# Patient Record
Sex: MALE | Race: AMERICAN INDIAN OR ALASKA NATIVE | Employment: UNEMPLOYED | URBAN - METROPOLITAN AREA
[De-identification: names, ages, dates, MRNs, and addresses within clinical notes are randomized per-mention and may not be internally consistent; named-entity substitution may affect disease eponyms.]

---

## 2022-06-02 ENCOUNTER — APPOINTMENT (OUTPATIENT)
Dept: GENERAL RADIOLOGY | Age: 32
End: 2022-06-02
Payer: COMMERCIAL

## 2022-06-02 ENCOUNTER — HOSPITAL ENCOUNTER (OUTPATIENT)
Age: 32
Setting detail: OBSERVATION
Discharge: HOME | End: 2022-06-03
Attending: EMERGENCY MEDICINE | Admitting: FAMILY MEDICINE
Payer: COMMERCIAL

## 2022-06-02 ENCOUNTER — APPOINTMENT (OUTPATIENT)
Dept: CT IMAGING | Age: 32
End: 2022-06-02
Payer: COMMERCIAL

## 2022-06-02 DIAGNOSIS — R73.9 HYPERGLYCEMIA: ICD-10-CM

## 2022-06-02 DIAGNOSIS — I21.4 NSTEMI (NON-ST ELEVATED MYOCARDIAL INFARCTION) (HCC): Primary | ICD-10-CM

## 2022-06-02 PROBLEM — F19.10 POLYSUBSTANCE ABUSE (HCC): Status: ACTIVE | Noted: 2022-06-02

## 2022-06-02 PROBLEM — T50.901A OVERDOSE, ACCIDENTAL OR UNINTENTIONAL, INITIAL ENCOUNTER: Status: ACTIVE | Noted: 2022-06-02

## 2022-06-02 PROBLEM — J69.0 ASPIRATION PNEUMONITIS (HCC): Status: ACTIVE | Noted: 2022-06-02

## 2022-06-02 PROBLEM — Z72.0 TOBACCO ABUSE: Status: ACTIVE | Noted: 2022-06-02

## 2022-06-02 PROBLEM — E66.9 OBESITY WITH BODY MASS INDEX GREATER THAN 30: Status: ACTIVE | Noted: 2022-06-02

## 2022-06-02 PROBLEM — Z78.9 ALCOHOL USE: Status: ACTIVE | Noted: 2022-06-02

## 2022-06-02 LAB
-: NORMAL
ABSOLUTE EOS #: 0.15 K/UL (ref 0–0.44)
ABSOLUTE IMMATURE GRANULOCYTE: 0.17 K/UL (ref 0–0.3)
ABSOLUTE LYMPH #: 4.89 K/UL (ref 1.1–3.7)
ABSOLUTE MONO #: 0.7 K/UL (ref 0.1–1.2)
ALBUMIN SERPL-MCNC: 4.2 G/DL (ref 3.5–5.2)
ALBUMIN SERPL-MCNC: 4.4 G/DL (ref 3.5–5.2)
ALBUMIN/GLOBULIN RATIO: 1.8 (ref 1–2.5)
ALBUMIN/GLOBULIN RATIO: 2.1 (ref 1–2.5)
ALP BLD-CCNC: 116 U/L (ref 40–129)
ALP BLD-CCNC: 119 U/L (ref 40–129)
ALT SERPL-CCNC: 58 U/L (ref 5–41)
ALT SERPL-CCNC: 61 U/L (ref 5–41)
AMPHETAMINE SCREEN URINE: NEGATIVE
ANION GAP SERPL CALCULATED.3IONS-SCNC: 17 MMOL/L (ref 9–17)
ANION GAP SERPL CALCULATED.3IONS-SCNC: 21 MMOL/L (ref 9–17)
ANION GAP: 12 MMOL/L (ref 7–16)
AST SERPL-CCNC: 50 U/L
AST SERPL-CCNC: 73 U/L
BARBITURATE SCREEN URINE: NEGATIVE
BASOPHILS # BLD: 1 % (ref 0–2)
BASOPHILS ABSOLUTE: 0.09 K/UL (ref 0–0.2)
BENZODIAZEPINE SCREEN, URINE: NEGATIVE
BETA-HYDROXYBUTYRATE: 0.2 MMOL/L (ref 0.02–0.27)
BILIRUB SERPL-MCNC: 0.24 MG/DL (ref 0.3–1.2)
BILIRUB SERPL-MCNC: 0.4 MG/DL (ref 0.3–1.2)
BILIRUBIN DIRECT: 0.08 MG/DL
BILIRUBIN DIRECT: <0.08 MG/DL
BILIRUBIN URINE: NEGATIVE
BILIRUBIN, INDIRECT: 0.32 MG/DL (ref 0–1)
BILIRUBIN, INDIRECT: ABNORMAL MG/DL (ref 0–1)
BUN BLDV-MCNC: 10 MG/DL (ref 6–20)
BUN BLDV-MCNC: 11 MG/DL (ref 6–20)
C-REACTIVE PROTEIN: <3 MG/L (ref 0–5)
CALCIUM SERPL-MCNC: 8.5 MG/DL (ref 8.6–10.4)
CALCIUM SERPL-MCNC: 8.7 MG/DL (ref 8.6–10.4)
CANNABINOID SCREEN URINE: NEGATIVE
CHLORIDE BLD-SCNC: 100 MMOL/L (ref 98–107)
CHLORIDE BLD-SCNC: 100 MMOL/L (ref 98–107)
CHOLESTEROL/HDL RATIO: 3.7
CHOLESTEROL: 178 MG/DL
CHP ED QC CHECK: YES
CO2: 17 MMOL/L (ref 20–31)
CO2: 20 MMOL/L (ref 20–31)
COCAINE METABOLITE, URINE: NEGATIVE
COLOR: YELLOW
CREAT SERPL-MCNC: 0.94 MG/DL (ref 0.7–1.2)
CREAT SERPL-MCNC: 1.12 MG/DL (ref 0.7–1.2)
D-DIMER QUANTITATIVE: 1.57 MG/L FEU
EKG ATRIAL RATE: 109 BPM
EKG ATRIAL RATE: 83 BPM
EKG P AXIS: 24 DEGREES
EKG P AXIS: 25 DEGREES
EKG P-R INTERVAL: 154 MS
EKG P-R INTERVAL: 172 MS
EKG Q-T INTERVAL: 358 MS
EKG Q-T INTERVAL: 374 MS
EKG QRS DURATION: 108 MS
EKG QRS DURATION: 112 MS
EKG QTC CALCULATION (BAZETT): 439 MS
EKG QTC CALCULATION (BAZETT): 482 MS
EKG R AXIS: 2 DEGREES
EKG R AXIS: 28 DEGREES
EKG T AXIS: 19 DEGREES
EKG T AXIS: 32 DEGREES
EKG VENTRICULAR RATE: 109 BPM
EKG VENTRICULAR RATE: 83 BPM
EOSINOPHILS RELATIVE PERCENT: 1 % (ref 1–4)
EPITHELIAL CELLS UA: NORMAL /HPF (ref 0–5)
ESTIMATED AVERAGE GLUCOSE: 105 MG/DL
FERRITIN: 129 NG/ML (ref 30–400)
FLU A ANTIGEN: NEGATIVE
FLU B ANTIGEN: NEGATIVE
GFR AFRICAN AMERICAN: >60 ML/MIN
GFR AFRICAN AMERICAN: >60 ML/MIN
GFR NON-AFRICAN AMERICAN: >60 ML/MIN
GFR SERPL CREATININE-BSD FRML MDRD: >60 ML/MIN
GFR SERPL CREATININE-BSD FRML MDRD: ABNORMAL ML/MIN/{1.73_M2}
GFR SERPL CREATININE-BSD FRML MDRD: ABNORMAL ML/MIN/{1.73_M2}
GFR SERPL CREATININE-BSD FRML MDRD: NORMAL ML/MIN/{1.73_M2}
GLUCOSE BLD-MCNC: 133 MG/DL
GLUCOSE BLD-MCNC: 133 MG/DL (ref 74–100)
GLUCOSE BLD-MCNC: 138 MG/DL (ref 70–99)
GLUCOSE BLD-MCNC: 296 MG/DL (ref 70–99)
GLUCOSE URINE: NEGATIVE
HBA1C MFR BLD: 5.3 % (ref 4–6)
HCO3 VENOUS: 23.7 MMOL/L (ref 22–29)
HCT VFR BLD CALC: 46.6 % (ref 40.7–50.3)
HDLC SERPL-MCNC: 48 MG/DL
HEMOGLOBIN: 16 G/DL (ref 13–17)
IMMATURE GRANULOCYTES: 1 %
INR BLD: 1
KETONES, URINE: NEGATIVE
LACTATE DEHYDROGENASE: 289 U/L (ref 135–225)
LACTIC ACID, WHOLE BLOOD: 3.4 MMOL/L (ref 0.7–2.1)
LDL CHOLESTEROL: 122 MG/DL (ref 0–130)
LEUKOCYTE ESTERASE, URINE: NEGATIVE
LYMPHOCYTES # BLD: 36 % (ref 24–43)
MCH RBC QN AUTO: 31.4 PG (ref 25.2–33.5)
MCHC RBC AUTO-ENTMCNC: 34.3 G/DL (ref 28.4–34.8)
MCV RBC AUTO: 91.6 FL (ref 82.6–102.9)
METHADONE SCREEN, URINE: NEGATIVE
MONOCYTES # BLD: 5 % (ref 3–12)
NEGATIVE BASE EXCESS, VEN: 3 (ref 0–2)
NITRITE, URINE: NEGATIVE
NRBC AUTOMATED: 0 PER 100 WBC
O2 SAT, VEN: 68 % (ref 60–85)
OPIATES, URINE: NEGATIVE
OXYCODONE SCREEN URINE: NEGATIVE
PARTIAL THROMBOPLASTIN TIME: 22.9 SEC (ref 20.5–30.5)
PARTIAL THROMBOPLASTIN TIME: 41 SEC (ref 20.5–30.5)
PARTIAL THROMBOPLASTIN TIME: 48.7 SEC (ref 20.5–30.5)
PARTIAL THROMBOPLASTIN TIME: 51.4 SEC (ref 20.5–30.5)
PCO2, VEN: 46.6 MM HG (ref 41–51)
PDW BLD-RTO: 12.5 % (ref 11.8–14.4)
PH UA: 5.5 (ref 5–8)
PH VENOUS: 7.31 (ref 7.32–7.43)
PHENCYCLIDINE, URINE: NEGATIVE
PLATELET # BLD: 222 K/UL (ref 138–453)
PMV BLD AUTO: 11.3 FL (ref 8.1–13.5)
PO2, VEN: 38.7 MM HG (ref 30–50)
POC BUN: 10 MG/DL (ref 8–26)
POC CHLORIDE: 105 MMOL/L (ref 98–107)
POC CREATININE: 1.01 MG/DL (ref 0.51–1.19)
POC HEMATOCRIT: 44 % (ref 41–53)
POC HEMOGLOBIN: 14.9 G/DL (ref 13.5–17.5)
POC IONIZED CALCIUM: 1.08 MMOL/L (ref 1.15–1.33)
POC LACTIC ACID: 5.1 MMOL/L (ref 0.56–1.39)
POC POTASSIUM: 4.4 MMOL/L (ref 3.5–4.5)
POC SODIUM: 140 MMOL/L (ref 138–146)
POC TCO2: 24 MMOL/L (ref 22–30)
POTASSIUM SERPL-SCNC: 3.1 MMOL/L (ref 3.7–5.3)
POTASSIUM SERPL-SCNC: 4.4 MMOL/L (ref 3.7–5.3)
PRO-BNP: 186 PG/ML
PROCALCITONIN: 0.38 NG/ML
PROCALCITONIN: 0.44 NG/ML
PROTEIN UA: NEGATIVE
PROTHROMBIN TIME: 11 SEC (ref 9.1–12.3)
RBC # BLD: 5.09 M/UL (ref 4.21–5.77)
RBC UA: NORMAL /HPF (ref 0–4)
SARS-COV-2, RAPID: NOT DETECTED
SEG NEUTROPHILS: 56 % (ref 36–65)
SEGMENTED NEUTROPHILS ABSOLUTE COUNT: 7.55 K/UL (ref 1.5–8.1)
SODIUM BLD-SCNC: 137 MMOL/L (ref 135–144)
SODIUM BLD-SCNC: 138 MMOL/L (ref 135–144)
SPECIFIC GRAVITY UA: 1.04 (ref 1–1.03)
SPECIMEN DESCRIPTION: NORMAL
TEST INFORMATION: NORMAL
TOTAL PROTEIN: 6.5 G/DL (ref 6.4–8.3)
TOTAL PROTEIN: 6.5 G/DL (ref 6.4–8.3)
TRIGL SERPL-MCNC: 41 MG/DL
TROPONIN, HIGH SENSITIVITY: 13 NG/L (ref 0–22)
TROPONIN, HIGH SENSITIVITY: 37 NG/L (ref 0–22)
TROPONIN, HIGH SENSITIVITY: 42 NG/L (ref 0–22)
TROPONIN, HIGH SENSITIVITY: 59 NG/L (ref 0–22)
TURBIDITY: CLEAR
URINE HGB: ABNORMAL
UROBILINOGEN, URINE: NORMAL
WBC # BLD: 13.6 K/UL (ref 3.5–11.3)
WBC UA: NORMAL /HPF (ref 0–5)

## 2022-06-02 PROCEDURE — 82010 KETONE BODYS QUAN: CPT

## 2022-06-02 PROCEDURE — 96367 TX/PROPH/DG ADDL SEQ IV INF: CPT

## 2022-06-02 PROCEDURE — 96365 THER/PROPH/DIAG IV INF INIT: CPT

## 2022-06-02 PROCEDURE — 81001 URINALYSIS AUTO W/SCOPE: CPT

## 2022-06-02 PROCEDURE — 85730 THROMBOPLASTIN TIME PARTIAL: CPT

## 2022-06-02 PROCEDURE — 2580000003 HC RX 258: Performed by: INTERNAL MEDICINE

## 2022-06-02 PROCEDURE — 2580000003 HC RX 258: Performed by: STUDENT IN AN ORGANIZED HEALTH CARE EDUCATION/TRAINING PROGRAM

## 2022-06-02 PROCEDURE — 87804 INFLUENZA ASSAY W/OPTIC: CPT

## 2022-06-02 PROCEDURE — 96375 TX/PRO/DX INJ NEW DRUG ADDON: CPT

## 2022-06-02 PROCEDURE — 80307 DRUG TEST PRSMV CHEM ANLYZR: CPT

## 2022-06-02 PROCEDURE — 82728 ASSAY OF FERRITIN: CPT

## 2022-06-02 PROCEDURE — 80076 HEPATIC FUNCTION PANEL: CPT

## 2022-06-02 PROCEDURE — 6360000002 HC RX W HCPCS: Performed by: STUDENT IN AN ORGANIZED HEALTH CARE EDUCATION/TRAINING PROGRAM

## 2022-06-02 PROCEDURE — 84484 ASSAY OF TROPONIN QUANT: CPT

## 2022-06-02 PROCEDURE — 71045 X-RAY EXAM CHEST 1 VIEW: CPT

## 2022-06-02 PROCEDURE — 84145 PROCALCITONIN (PCT): CPT

## 2022-06-02 PROCEDURE — 86140 C-REACTIVE PROTEIN: CPT

## 2022-06-02 PROCEDURE — 36415 COLL VENOUS BLD VENIPUNCTURE: CPT

## 2022-06-02 PROCEDURE — 85025 COMPLETE CBC W/AUTO DIFF WBC: CPT

## 2022-06-02 PROCEDURE — 2060000000 HC ICU INTERMEDIATE R&B

## 2022-06-02 PROCEDURE — 83036 HEMOGLOBIN GLYCOSYLATED A1C: CPT

## 2022-06-02 PROCEDURE — 83615 LACTATE (LD) (LDH) ENZYME: CPT

## 2022-06-02 PROCEDURE — 84520 ASSAY OF UREA NITROGEN: CPT

## 2022-06-02 PROCEDURE — 82565 ASSAY OF CREATININE: CPT

## 2022-06-02 PROCEDURE — 96366 THER/PROPH/DIAG IV INF ADDON: CPT

## 2022-06-02 PROCEDURE — 82803 BLOOD GASES ANY COMBINATION: CPT

## 2022-06-02 PROCEDURE — 85610 PROTHROMBIN TIME: CPT

## 2022-06-02 PROCEDURE — 83880 ASSAY OF NATRIURETIC PEPTIDE: CPT

## 2022-06-02 PROCEDURE — 82947 ASSAY GLUCOSE BLOOD QUANT: CPT

## 2022-06-02 PROCEDURE — G0378 HOSPITAL OBSERVATION PER HR: HCPCS

## 2022-06-02 PROCEDURE — 85379 FIBRIN DEGRADATION QUANT: CPT

## 2022-06-02 PROCEDURE — 71260 CT THORAX DX C+: CPT

## 2022-06-02 PROCEDURE — 96376 TX/PRO/DX INJ SAME DRUG ADON: CPT

## 2022-06-02 PROCEDURE — 80051 ELECTROLYTE PANEL: CPT

## 2022-06-02 PROCEDURE — 85014 HEMATOCRIT: CPT

## 2022-06-02 PROCEDURE — 96361 HYDRATE IV INFUSION ADD-ON: CPT

## 2022-06-02 PROCEDURE — 82330 ASSAY OF CALCIUM: CPT

## 2022-06-02 PROCEDURE — 87635 SARS-COV-2 COVID-19 AMP PRB: CPT

## 2022-06-02 PROCEDURE — 80061 LIPID PANEL: CPT

## 2022-06-02 PROCEDURE — 87040 BLOOD CULTURE FOR BACTERIA: CPT

## 2022-06-02 PROCEDURE — 96368 THER/DIAG CONCURRENT INF: CPT

## 2022-06-02 PROCEDURE — 93005 ELECTROCARDIOGRAM TRACING: CPT | Performed by: STUDENT IN AN ORGANIZED HEALTH CARE EDUCATION/TRAINING PROGRAM

## 2022-06-02 PROCEDURE — 6360000002 HC RX W HCPCS: Performed by: INTERNAL MEDICINE

## 2022-06-02 PROCEDURE — 83605 ASSAY OF LACTIC ACID: CPT

## 2022-06-02 PROCEDURE — 99223 1ST HOSP IP/OBS HIGH 75: CPT | Performed by: INTERNAL MEDICINE

## 2022-06-02 PROCEDURE — 97162 PT EVAL MOD COMPLEX 30 MIN: CPT

## 2022-06-02 PROCEDURE — 97530 THERAPEUTIC ACTIVITIES: CPT

## 2022-06-02 PROCEDURE — 93010 ELECTROCARDIOGRAM REPORT: CPT | Performed by: INTERNAL MEDICINE

## 2022-06-02 PROCEDURE — 6360000004 HC RX CONTRAST MEDICATION: Performed by: STUDENT IN AN ORGANIZED HEALTH CARE EDUCATION/TRAINING PROGRAM

## 2022-06-02 PROCEDURE — 80048 BASIC METABOLIC PNL TOTAL CA: CPT

## 2022-06-02 PROCEDURE — 99285 EMERGENCY DEPT VISIT HI MDM: CPT

## 2022-06-02 PROCEDURE — 6370000000 HC RX 637 (ALT 250 FOR IP): Performed by: STUDENT IN AN ORGANIZED HEALTH CARE EDUCATION/TRAINING PROGRAM

## 2022-06-02 RX ORDER — ACETAMINOPHEN 325 MG/1
650 TABLET ORAL EVERY 6 HOURS PRN
Status: DISCONTINUED | OUTPATIENT
Start: 2022-06-02 | End: 2022-06-03 | Stop reason: HOSPADM

## 2022-06-02 RX ORDER — HEPARIN SODIUM 1000 [USP'U]/ML
4000 INJECTION, SOLUTION INTRAVENOUS; SUBCUTANEOUS ONCE
Status: COMPLETED | OUTPATIENT
Start: 2022-06-02 | End: 2022-06-02

## 2022-06-02 RX ORDER — ACETAMINOPHEN 650 MG/1
650 SUPPOSITORY RECTAL EVERY 6 HOURS PRN
Status: DISCONTINUED | OUTPATIENT
Start: 2022-06-02 | End: 2022-06-03 | Stop reason: HOSPADM

## 2022-06-02 RX ORDER — SODIUM CHLORIDE 9 MG/ML
INJECTION, SOLUTION INTRAVENOUS
Status: DISPENSED
Start: 2022-06-02 | End: 2022-06-02

## 2022-06-02 RX ORDER — ONDANSETRON 2 MG/ML
4 INJECTION INTRAMUSCULAR; INTRAVENOUS EVERY 6 HOURS PRN
Status: DISCONTINUED | OUTPATIENT
Start: 2022-06-02 | End: 2022-06-03 | Stop reason: HOSPADM

## 2022-06-02 RX ORDER — POTASSIUM CHLORIDE 7.45 MG/ML
10 INJECTION INTRAVENOUS ONCE
Status: DISCONTINUED | OUTPATIENT
Start: 2022-06-02 | End: 2022-06-03 | Stop reason: HOSPADM

## 2022-06-02 RX ORDER — 0.9 % SODIUM CHLORIDE 0.9 %
1000 INTRAVENOUS SOLUTION INTRAVENOUS ONCE
Status: COMPLETED | OUTPATIENT
Start: 2022-06-02 | End: 2022-06-02

## 2022-06-02 RX ORDER — ASPIRIN 81 MG/1
324 TABLET, CHEWABLE ORAL ONCE
Status: COMPLETED | OUTPATIENT
Start: 2022-06-02 | End: 2022-06-02

## 2022-06-02 RX ORDER — SODIUM CHLORIDE 0.9 % (FLUSH) 0.9 %
10 SYRINGE (ML) INJECTION PRN
Status: DISCONTINUED | OUTPATIENT
Start: 2022-06-02 | End: 2022-06-03 | Stop reason: HOSPADM

## 2022-06-02 RX ORDER — POTASSIUM CHLORIDE 7.45 MG/ML
10 INJECTION INTRAVENOUS PRN
Status: DISCONTINUED | OUTPATIENT
Start: 2022-06-02 | End: 2022-06-03 | Stop reason: HOSPADM

## 2022-06-02 RX ORDER — POTASSIUM CHLORIDE 20 MEQ/1
40 TABLET, EXTENDED RELEASE ORAL PRN
Status: DISCONTINUED | OUTPATIENT
Start: 2022-06-02 | End: 2022-06-03 | Stop reason: HOSPADM

## 2022-06-02 RX ORDER — HEPARIN SODIUM 1000 [USP'U]/ML
2000 INJECTION, SOLUTION INTRAVENOUS; SUBCUTANEOUS PRN
Status: DISCONTINUED | OUTPATIENT
Start: 2022-06-02 | End: 2022-06-03

## 2022-06-02 RX ORDER — ONDANSETRON 2 MG/ML
4 INJECTION INTRAMUSCULAR; INTRAVENOUS ONCE
Status: COMPLETED | OUTPATIENT
Start: 2022-06-02 | End: 2022-06-02

## 2022-06-02 RX ORDER — CEFTRIAXONE 1 G/1
INJECTION, POWDER, FOR SOLUTION INTRAMUSCULAR; INTRAVENOUS
Status: COMPLETED
Start: 2022-06-02 | End: 2022-06-02

## 2022-06-02 RX ORDER — ONDANSETRON 4 MG/1
4 TABLET, ORALLY DISINTEGRATING ORAL EVERY 8 HOURS PRN
Status: DISCONTINUED | OUTPATIENT
Start: 2022-06-02 | End: 2022-06-03 | Stop reason: HOSPADM

## 2022-06-02 RX ORDER — HEPARIN SODIUM AND DEXTROSE 10000; 5 [USP'U]/100ML; G/100ML
5-30 INJECTION INTRAVENOUS CONTINUOUS
Status: DISCONTINUED | OUTPATIENT
Start: 2022-06-02 | End: 2022-06-03

## 2022-06-02 RX ORDER — SODIUM CHLORIDE 0.9 % (FLUSH) 0.9 %
5-40 SYRINGE (ML) INJECTION EVERY 12 HOURS SCHEDULED
Status: DISCONTINUED | OUTPATIENT
Start: 2022-06-02 | End: 2022-06-03 | Stop reason: HOSPADM

## 2022-06-02 RX ORDER — SODIUM CHLORIDE, SODIUM LACTATE, POTASSIUM CHLORIDE, AND CALCIUM CHLORIDE .6; .31; .03; .02 G/100ML; G/100ML; G/100ML; G/100ML
1180 INJECTION, SOLUTION INTRAVENOUS ONCE
Status: COMPLETED | OUTPATIENT
Start: 2022-06-02 | End: 2022-06-02

## 2022-06-02 RX ORDER — POTASSIUM CHLORIDE 7.45 MG/ML
10 INJECTION INTRAVENOUS ONCE
Status: COMPLETED | OUTPATIENT
Start: 2022-06-02 | End: 2022-06-02

## 2022-06-02 RX ORDER — HEPARIN SODIUM 1000 [USP'U]/ML
4000 INJECTION, SOLUTION INTRAVENOUS; SUBCUTANEOUS PRN
Status: DISCONTINUED | OUTPATIENT
Start: 2022-06-02 | End: 2022-06-03

## 2022-06-02 RX ORDER — MAGNESIUM SULFATE 1 G/100ML
1000 INJECTION INTRAVENOUS PRN
Status: DISCONTINUED | OUTPATIENT
Start: 2022-06-02 | End: 2022-06-03 | Stop reason: HOSPADM

## 2022-06-02 RX ORDER — SODIUM CHLORIDE 9 MG/ML
INJECTION, SOLUTION INTRAVENOUS PRN
Status: DISCONTINUED | OUTPATIENT
Start: 2022-06-02 | End: 2022-06-03 | Stop reason: HOSPADM

## 2022-06-02 RX ADMIN — CEFTRIAXONE SODIUM 1000 MG: 500 INJECTION, POWDER, FOR SOLUTION INTRAMUSCULAR; INTRAVENOUS at 05:47

## 2022-06-02 RX ADMIN — Medication 500 MG: at 06:29

## 2022-06-02 RX ADMIN — SODIUM CHLORIDE 1000 ML: 9 INJECTION, SOLUTION INTRAVENOUS at 00:39

## 2022-06-02 RX ADMIN — SODIUM CHLORIDE, POTASSIUM CHLORIDE, SODIUM LACTATE AND CALCIUM CHLORIDE 1180 ML: 600; 310; 30; 20 INJECTION, SOLUTION INTRAVENOUS at 05:47

## 2022-06-02 RX ADMIN — HEPARIN SODIUM 4000 UNITS: 1000 INJECTION INTRAVENOUS; SUBCUTANEOUS at 04:55

## 2022-06-02 RX ADMIN — SODIUM CHLORIDE 1000 ML: 9 INJECTION, SOLUTION INTRAVENOUS at 01:40

## 2022-06-02 RX ADMIN — IOPAMIDOL 75 ML: 755 INJECTION, SOLUTION INTRAVENOUS at 03:37

## 2022-06-02 RX ADMIN — Medication 14 UNITS/KG/HR: at 21:49

## 2022-06-02 RX ADMIN — Medication 12 UNITS/KG/HR: at 04:58

## 2022-06-02 RX ADMIN — SODIUM CHLORIDE, PRESERVATIVE FREE 10 ML: 5 INJECTION INTRAVENOUS at 09:00

## 2022-06-02 RX ADMIN — ONDANSETRON 4 MG: 2 INJECTION INTRAMUSCULAR; INTRAVENOUS at 00:42

## 2022-06-02 RX ADMIN — HEPARIN SODIUM 2000 UNITS: 1000 INJECTION INTRAVENOUS; SUBCUTANEOUS at 23:02

## 2022-06-02 RX ADMIN — ASPIRIN 324 MG: 81 TABLET, CHEWABLE ORAL at 04:54

## 2022-06-02 RX ADMIN — HEPARIN SODIUM 2000 UNITS: 1000 INJECTION INTRAVENOUS; SUBCUTANEOUS at 11:40

## 2022-06-02 RX ADMIN — POTASSIUM CHLORIDE 10 MEQ: 7.46 INJECTION, SOLUTION INTRAVENOUS at 02:27

## 2022-06-02 ASSESSMENT — PAIN SCALES - GENERAL
PAINLEVEL_OUTOF10: 10
PAINLEVEL_OUTOF10: 0

## 2022-06-02 ASSESSMENT — PAIN - FUNCTIONAL ASSESSMENT: PAIN_FUNCTIONAL_ASSESSMENT: 0-10

## 2022-06-02 NOTE — ED NOTES
Pt unable to provide urine using urinal. RN ambulates pt to bathroom with urine specimen cup.       Nel Bales RN  06/02/22 8874

## 2022-06-02 NOTE — CONSULTS
[facility]   Consult Note         Today's Date: 6/2/2022  Patient Name: Skip Hart  Date of admission: 6/2/2022 12:10 AM  Patient's age: 33 y. o., 1990  Admission Dx: Hyperglycemia [R73.9]  NSTEMI (non-ST elevated myocardial infarction) ([facility] [address] [I21.4]  Overdose, accidental or unintentional, initial encounter [T55.048E]    Reason for Consult:  Cardiac evaluation    Requesting Physician: Zena Gordon DO    REASON FOR CONSULT: Troponin elevation    History Obtained From:  Patient, chart, staff, records    HISTORY OF PRESENT ILLNESS:      The patient is a 33 y.o. male who is admitted to the hospital after being found down by his friends. Reported substance and needle injection of the substance. He admits to drinking alcohol for the last 4 days. He mentioned that he has been drinking 3-4 beers a day for the last 4 days. Patient was found down and lost consciousness responded to Narcan. Cardiology consulted for troponin elevation. Troponin: 13-42-59-37  EKG no acute ST or T wave changes. Patient afebrile hemodynamically stable. Alert oriented x3  Denies chest pain/palpitations/lightheadedness. No significant past cardiac history. No family history of premature coronary artery disease. Smokes 1 cigarette in a week. Past Medical History:   has a past medical history of Alcohol use, History of cocaine use, Obesity with body mass index greater than 30, and Tobacco abuse. Past Surgical History:   has no past surgical history on file. Home Medications:    Prior to Admission medications    Not on File       potassium chloride, 10 mEq, IntraVENous, Once    sodium chloride flush, 5-40 mL, IntraVENous, 2 times per day      Allergies:  Patient has no known allergies. Social History:   reports that he has been smoking cigarettes. He has never used smokeless tobacco. He reports current alcohol use. He reports current drug use. Drugs: Marijuana (Weed) and Cocaine. Family History: family history includes Diabetes type 2  in his father. No h/o sudden cardiac death. REVIEW OF SYSTEMS:    · Constitutional: there has been no unanticipated weight loss. There's been No change in energy level, No change in activity level. · Eyes: No visual changes or diplopia. No scleral icterus. · ENT: No Headaches, hearing loss or vertigo. No mouth sores or sore throat. · Cardiovascular: No cardiac history  · Respiratory: No previous pulmonary problems  · Gastrointestinal: No abdominal pain, appetite loss, blood in stools. No change in bowel or bladder habits. · Genitourinary: No dysuria, trouble voiding, or hematuria. · Musculoskeletal:  No gait disturbance, No weakness or joint complaints. · Integumentary: No rash or pruritis. · Neurological: No headache, diplopia, change in muscle strength, numbness or tingling. No change in gait, balance, coordination, mood, affect, memory, mentation, behavior. · Psychiatric: No anxiety, or depression. · Endocrine: No temperature intolerance. No excessive thirst, fluid intake, or urination. No tremor. · Hematologic/Lymphatic: No abnormal bruising or bleeding, blood clots or swollen lymph nodes. · Allergic/Immunologic: No nasal congestion or hives. PHYSICAL EXAM:      /79   Pulse 89   Temp 98.2 °F (36.8 °C) (Oral)   Resp 18   Ht 5' 11\" (1.803 m)   Wt 235 lb (106.6 kg)   SpO2 97%   BMI 32.78 kg/m²    Constitutional and General Appearance: alert, cooperative, no distress and appears stated age  HEENT: PERRL, no cervical lymphadenopathy. No masses palpable. Normal oral mucosa  Respiratory:  · Normal excursion and expansion without use of accessory muscles  · Resp Auscultation: Good respiratory effort. No for increased work of breathing.  On auscultation: clear to auscultation bilaterally  Cardiovascular:  · The apical impulse is not displaced  · Heart tones are crisp and normal. regular S1 and S2.  · Jugular venous pulsation Normal  · The carotid upstroke is normal in amplitude and contour without delay or bruit  · Peripheral pulses are symmetrical and full   Abdomen:   · No masses or tenderness  · Bowel sounds present  Extremities:  ·  No Cyanosis or Clubbing  ·  Lower extremity edema: No  ·  Skin: Warm and dry  Neurological:  · Alert and oriented. · Moves all extremities well  · No abnormalities of mood, affect, memory, mentation, or behavior are noted          Labs:     CBC:   Recent Labs     06/02/22  0050   WBC 13.6*   HGB 16.0   HCT 46.6        BMP:   Recent Labs     06/02/22  0050 06/02/22  0050 06/02/22  0332 06/02/22  0336 06/02/22  0348     --   --  137  --    K 3.1*  --   --  4.4  --    CO2 17*  --   --  20  --    BUN 11  --   --  10  --    CREATININE 1.12  --  1.01 0.94  --    LABGLOM >60   < > >60 >60  --    GLUCOSE 296*   < >  --  138* 133    < > = values in this interval not displayed. BNP: No results for input(s): BNP in the last 72 hours. PT/INR:   Recent Labs     06/02/22  0904   PROTIME 11.0   INR 1.0     APTT:  Recent Labs     06/02/22  0434 06/02/22  1039   APTT 22.9 41.0*     CARDIAC ENZYMES:No results for input(s): CKTOTAL, CKMB, CKMBINDEX, TROPONINI in the last 72 hours. FASTING LIPID PANEL:  Lab Results   Component Value Date    HDL 48 06/02/2022    TRIG 41 06/02/2022     LIVER PROFILE:  Recent Labs     06/02/22  0228 06/02/22  0904   AST 50* 73*   ALT 58* 61*   LABALBU 4.4 4.2         Other Current Problems  Patient Active Problem List   Diagnosis    Overdose, accidental or unintentional, initial encounter    Alcohol use    Tobacco abuse    Obesity with body mass index greater than 30    NSTEMI (non-ST elevated myocardial infarction) ([facility] [address]    Aspiration pneumonitis (HCC)    Polysubstance abuse ([facility] [address]     EKG: No acute ST or T wave changes. Normal sinus rhythm  Troponin 13-42-59-37      IMPRESSION & Recommendations:    NSTEMI, type II.   Likely secondary to substance use  Pneumonia CAP versus aspiration  Continue heparin drip. Follow-up echo  We will follow    Discussed with patient, family, and Nurse. Candice Patel MD  PGY-3, Internal Medicine Resident  [address]  6/2/2022 1:34 PM    Attending Cardiologist Addendum: I have reviewed and performed the history, physical, subjective, objective, assessment, and plan with the student/resident/fellow/APN and agree with the note. I performed the history and physical personally. I have made changes to the note above as needed. Elevated trop- likely type 2- due substance abuse  ETOH  Drug abuse  - on heparin drip  - check 2d echo- if low risk, stop heparin drip and consider stress test as outpatient    Thank you for allowing me to participate in the care of this patient, please do not hesitate to call if you have any questions. Lb Mejia DO, [facility] [place], [address], [address], [place] [address] [facility]  [facility] [place]  [phone]

## 2022-06-02 NOTE — CARE COORDINATION
Case Management Initial Discharge Plan  Ricky Stephen,             Met with:patient to discuss discharge plans. Information verified: address, contacts, phone number, , insurance Yes  Insurance Provider: none, was seen by HELP  [place]: No    Emergency Contact/Next of Kin name & number: parent Rae Lombardo [phone]  Who are involved in patient's support system? family    PCP: No primary care provider on file. Date of last visit: S. Bertram, unknow      Discharge Planning    Living Arrangements:        Home has 2 stories   stairs to climb to get into front door, 1 flight stairs to climb to reach second floor  Location of bedroom/bathroom in home second level    Patient able to perform ADL's:Independent    Current Services (outpatient & in home) none  DME equipment: none  DME provider: n/a    Is patient receiving oral anticoagulation therapy? No        Does patient have any issues/concerns obtaining medications? No  If yes, what are patient's concerns? Is there a preferred Pharmacy after hours or on weekends? No    If yes, which pharmacy? Potential Assistance Needed:       Patient agreeable to home care: No  [place] of choice provided:  n/a    Prior SNF/Rehab Placement and Facility: no  Agreeable to SNF/Rehab: No  [place] of choice provided: n/a     Evaluation: n/a    Expected Discharge date:       Patient expects to be discharged to: If home: is the family and/or caregiver wiling & able to provide support at home? yes  Who will be providing this support? family    Follow Up Appointment: Best Day/ Time:      Transportation provider: family  Transportation arrangements needed for discharge: No    Readmission Risk              Risk of Unplanned Readmission:  13             Does patient have a readmission risk score greater than 14?: No  If yes, follow-up appointment must be made within 7 days of discharge.      Goals of Care: safety      Educated patient on transitional options, provided freedom of choice and are agreeable with plan      Discharge Plan: home, independent, has a ride          Electronically signed by Kalie Soto RN on 6/2/22 at 4:42 PM EDT

## 2022-06-02 NOTE — H&P
[facility]  Office: [address], DO, Gisella Delgadillo, DO, Darshan Shown, DO, Tamara Remedies Blood, DO, Bo Lopez MD, Arnie Smith MD, Britany Oropeza MD, Delores Campos MD, Apurva Peterson MD, Joana Salgado MD, Dasha Stroud MD, Rolando Davis, DO, Alyx Almodovar MD,  Franklyn Corrigan, DO, Awais Haley MD, Melvern Essex, MD, Marko Hanna MD, Saeid Sewell, DO, Darrell Haque MD, Luis Fernando Xiong MD, Eula Clive, DO, Sarah Lubin MD, Amber Guerra CNP, [address], [facility], Sincree Carrillo, [facility], Danielle Echeverria, CNP, Jhoan Mcneill, CNP, Donya Shelby, CNP, Angela Perdomo PA-C, Estevan Guerrero, [facility], Cj Sanchez, CNP, Gely Madden, CNP, Nury Jha, CNP, Laure Wells, CNS, [facility], [facility], Vasile Ellis, CNP, Dontae Rubio, CNP, Zoran Bobby, [address]    HISTORY AND PHYSICAL EXAMINATION            Date:   6/2/2022  Patient name:  Saundra Shipman  Date of admission:  6/2/2022 12:10 AM  MRN:   [id number]  Account:  [de-identified]  YOB: 1990  PCP:    No primary care provider on file. Room:   10/10  Code Status:    No Order    Chief Complaint:     Chief Complaint   Patient presents with    Loss of Consciousness     45mns PTA     History Obtained From:     patient, electronic medical record    History of Present Illness:     Saundra Shipman is a 33 y.o. gentleman who presented to [facility] after being found altered/down by his friends after utilizing an unknown substance. Patient is otherwise healthy aside from his obesity. 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 He states that he typically throws these things away, but decided to save 1 particular viral to use at a later date. He used that by earlier this evening. He subsequently became altered and lost consciousness. He did respond to Narcan.   He states that he also grabbed a waste bag earlier that had multiple dirty needles in it. Does admit to having upper respiratory symptoms in the days leading up to today's event. States that that was improving earlier today. Labs in the emergency department revealed a normal troponin initially with hyperglycemia and elevated white blood cell count. D-dimer was noted positive, and patient underwent CT imaging of the chest to rule out pulmonary embolism. He does not have a pulmonary embolism, but does have scattered groundglass opacities throughout his lungs. His second troponin and third troponins have up trended. He has been started on heparin infusion along with IV antibiotics. Influenza and COVID are negative. Patient states that he is feeling back to his baseline. Past Medical History:     Past Medical History:   Diagnosis Date    Alcohol use     History of cocaine use     Obesity with body mass index greater than 30     Tobacco abuse         Past Surgical History:     History reviewed. No pertinent surgical history. Medications Prior to Admission:     Prior to Admission medications    Not on File        Allergies:     Patient has no known allergies. Social History:     Tobacco:    reports that he has been smoking cigarettes. He has never used smokeless tobacco.  Alcohol:      reports current alcohol use. Drug Use:  reports current drug use. Drugs: Marijuana (Weed) and Cocaine. Family History:     Family History   Problem Relation Age of Onset    Diabetes type 2  Father        Review of Systems:     Positive and Negative as described in HPI.     CONSTITUTIONAL:  negative for fevers, chills, sweats, fatigue, weight loss  HEENT: Reports scratchy throat and runny nose; negative for vision, hearing changes RESPIRATORY:  negative for shortness of breath, cough, congestion, wheezing  CARDIOVASCULAR:  negative for chest pain, palpitations  GASTROINTESTINAL:  negative for nausea, vomiting, diarrhea, constipation, change in bowel habits, abdominal pain   GENITOURINARY:  negative for difficulty of urination, burning with urination, frequency   INTEGUMENT:  negative for rash, skin lesions, easy bruising   HEMATOLOGIC/LYMPHATIC:  negative for swelling/edema   ALLERGIC/IMMUNOLOGIC:  negative for urticaria , itching  ENDOCRINE:  negative increase in drinking, increase in urination, hot or cold intolerance  MUSCULOSKELETAL:  negative joint pains, muscle aches, swelling of joints  NEUROLOGICAL: Reports back pain has resolved; negative for headaches, dizziness, lightheadedness, numbness, pain, tingling extremities  BEHAVIOR/PSYCH:  negative for depression, anxiety    Physical Exam:   /83   Pulse 90   Temp 98.2 °F (36.8 °C) (Oral)   Resp 18   Ht 5' 11\" (1.803 m)   Wt 235 lb (106.6 kg)   SpO2 98%   BMI 32.78 kg/m²   Temp (24hrs), Av.2 °F (36.8 °C), Min:98.2 °F (36.8 °C), Max:98.2 °F (36.8 °C)    Recent Labs     22  0332   POCGLU 133*     No intake or output data in the 24 hours ending 22 0607    General Appearance: alert, well appearing, and in no acute distress, obese gentleman sitting up in bed  Mental status: oriented to person, place, and time  Head: normocephalic, atraumatic  Eye: no icterus, redness, pupils equal and reactive, extraocular eye movements intact, conjunctiva clear  Ear: normal external ear, no discharge, hearing intact  Nose: no drainage noted  Mouth: mucous membranes moist  Neck: supple, no carotid bruits, thyroid not palpable  Lungs: Bilateral equal air entry, poor effort, clear to ausculation, no wheezing, rales or rhonchi  Cardiovascular: normal rate, regular rhythm, no murmur, gallop, rub  Abdomen: Soft, nontender, nondistended, normal bowel sounds, no hepatomegaly or splenomegaly  Neurologic: There are no new focal motor or sensory deficits, normal muscle tone and bulk, no abnormal sensation, normal speech, cranial nerves II through XII grossly intact  Skin: No gross lesions, rashes, bruising or bleeding on exposed skin area  Extremities: peripheral pulses palpable, no pedal edema or calf pain with palpation  Psych: normal affect    Investigations:      Laboratory Testing:  Recent Results (from the past 24 hour(s))   CBC with Auto Differential    Collection Time: 06/02/22 12:50 AM   Result Value Ref Range    WBC 13.6 (H) 3.5 - 11.3 k/uL    RBC 5.09 4. 21 - 5.77 m/uL    Hemoglobin 16.0 13.0 - 17.0 g/dL    Hematocrit 46.6 40.7 - 50.3 %    MCV 91.6 82.6 - 102.9 fL    MCH 31.4 25.2 - 33.5 pg    MCHC 34.3 28.4 - 34.8 g/dL    RDW 12.5 11.8 - 14.4 %    Platelets 287 449 - 035 k/uL    MPV 11.3 8.1 - 13.5 fL    NRBC Automated 0.0 0.0 per 100 WBC    Seg Neutrophils 56 36 - 65 %    Lymphocytes 36 24 - 43 %    Monocytes 5 3 - 12 %    Eosinophils % 1 1 - 4 %    Basophils 1 0 - 2 %    Immature Granulocytes 1 (H) 0 %    Segs Absolute 7.55 1.50 - 8.10 k/uL    Absolute Lymph # 4.89 (H) 1.10 - 3.70 k/uL    Absolute Mono # 0.70 0.10 - 1.20 k/uL    Absolute Eos # 0.15 0.00 - 0.44 k/uL    Basophils Absolute 0.09 0.00 - 0.20 k/uL    Absolute Immature Granulocyte 0.17 0.00 - 0.30 k/uL   Basic Metabolic Panel    Collection Time: 06/02/22 12:50 AM   Result Value Ref Range    Glucose 296 (H) 70 - 99 mg/dL    BUN 11 6 - 20 mg/dL    CREATININE 1.12 0.70 - 1.20 mg/dL    Calcium 8.7 8.6 - 10.4 mg/dL    Sodium 138 135 - 144 mmol/L    Potassium 3.1 (L) 3.7 - 5.3 mmol/L    Chloride 100 98 - 107 mmol/L    CO2 17 (L) 20 - 31 mmol/L    Anion Gap 21 (H) 9 - 17 mmol/L    GFR Non-African American >60 >60 mL/min    GFR African American >60 >60 mL/min    GFR Comment         Troponin    Collection Time: 06/02/22 12:50 AM   Result Value Ref Range    Troponin, High Sensitivity 13 0 - 22 ng/L   D-Dimer, Quantitative    Collection Time: 06/02/22 12:50 AM   Result Value Ref Range    D-Dimer, Quant 1.57 mg/L FEU   Troponin    Collection Time: 06/02/22  2:28 AM   Result Value Ref Range    Troponin, High Sensitivity 42 (H) 0 - 22 ng/L   Hepatic Function Panel    Collection Time: 06/02/22  2:28 AM   Result Value Ref Range    Albumin 4.4 3.5 - 5.2 g/dL    Alkaline Phosphatase 119 40 - 129 U/L    ALT 58 (H) 5 - 41 U/L    AST 50 (H) <40 U/L    Total Bilirubin 0.24 (L) 0.3 - 1.2 mg/dL    Bilirubin, Direct <0.08 <0.31 mg/dL    Bilirubin, Indirect Can not be calculated 0.00 - 1.00 mg/dL    Total Protein 6.5 6.4 - 8.3 g/dL    Albumin/Globulin Ratio 2.1 1.0 - 2.5   EKG 12 Lead    Collection Time: 06/02/22  3:14 AM   Result Value Ref Range    Ventricular Rate 83 BPM    Atrial Rate 83 BPM    P-R Interval 172 ms    QRS Duration 108 ms    Q-T Interval 374 ms    QTc Calculation (Bazett) 439 ms    P Axis 25 degrees    R Axis 2 degrees    T Axis 19 degrees   Venous Blood Gas, POC    Collection Time: 06/02/22  3:32 AM   Result Value Ref Range    pH, Eusebio 7.314 (L) 7.320 - 7.430    pCO2, Eusebio 46.6 41.0 - 51.0 mm Hg    pO2, Eusebio 38.7 30.0 - 50.0 mm Hg    HCO3, Venous 23.7 22.0 - 29.0 mmol/L    Negative Base Excess, Eusebio 3 (H) 0.0 - 2.0    O2 Sat, Eusebio 68 60.0 - 85.0 %   ELECTROLYTES PLUS    Collection Time: 06/02/22  3:32 AM   Result Value Ref Range    POC Sodium 140 138 - 146 mmol/L    POC Potassium 4.4 3.5 - 4.5 mmol/L    POC Chloride 105 98 - 107 mmol/L    POC TCO2 24 22 - 30 mmol/L    Anion Gap 12 7 - 16 mmol/L   Hemoglobin and hematocrit, blood    Collection Time: 06/02/22  3:32 AM   Result Value Ref Range    POC Hemoglobin 14.9 13.5 - 17.5 g/dL    POC Hematocrit 44 41 - 53 %   Creatinine W/GFR Point of Care    Collection Time: 06/02/22  3:32 AM   Result Value Ref Range    POC Creatinine 1.01 0.51 - 1.19 mg/dL    GFR Comment >60 >60 mL/min    GFR Non-African American >60 >60 mL/min    GFR Comment         CALCIUM, IONIC (POC)    Collection Time: 06/02/22  3:32 AM   Result Value Ref Range    POC Ionized Calcium 1.08 (L) 1.15 - 1.33 mmol/L   POCT urea (BUN)    Collection Time: 06/02/22  3:32 AM   Result Value Ref Range    POC BUN 10 8 - 26 mg/dL   Lactic Acid, POC    Collection Time: 06/02/22  3:32 AM   Result Value Ref Range    POC Lactic Acid 5.10 (H) 0.56 - 1.39 mmol/L   POCT Glucose    Collection Time: 06/02/22  3:32 AM   Result Value Ref Range    POC Glucose 133 (H) 74 - 100 mg/dL   Troponin    Collection Time: 06/02/22  3:36 AM   Result Value Ref Range    Troponin, High Sensitivity 59 (HH) 0 - 22 ng/L   Beta-Hydroxybutyrate    Collection Time: 06/02/22  3:36 AM   Result Value Ref Range    Beta-Hydroxybutyrate 0.20 0.02 - 0.27 mmol/L   Basic Metabolic Panel    Collection Time: 06/02/22  3:36 AM   Result Value Ref Range    Glucose 138 (H) 70 - 99 mg/dL    BUN 10 6 - 20 mg/dL    CREATININE 0.94 0.70 - 1.20 mg/dL    Calcium 8.5 (L) 8.6 - 10.4 mg/dL    Sodium 137 135 - 144 mmol/L    Potassium 4.4 3.7 - 5.3 mmol/L    Chloride 100 98 - 107 mmol/L    CO2 20 20 - 31 mmol/L    Anion Gap 17 9 - 17 mmol/L    GFR Non-African American >60 >60 mL/min    GFR African American >60 >60 mL/min    GFR Comment         POCT glucose    Collection Time: 06/02/22  3:48 AM   Result Value Ref Range    Glucose 133 mg/dL    QC OK? yes    APTT    Collection Time: 06/02/22  4:34 AM   Result Value Ref Range    PTT 22.9 20.5 - 30.5 sec   Urinalysis with Reflex to Culture    Collection Time: 06/02/22  4:40 AM    Specimen: Urine   Result Value Ref Range    Color, UA Yellow Yellow    Turbidity UA Clear Clear    Glucose, Ur NEGATIVE NEGATIVE    Bilirubin Urine NEGATIVE NEGATIVE    Ketones, Urine NEGATIVE NEGATIVE    Specific Gravity, UA 1.037 (H) 1.005 - 1.030    Urine Hgb TRACE (A) NEGATIVE    pH, UA 5.5 5.0 - 8.0    Protein, UA NEGATIVE NEGATIVE    Urobilinogen, Urine Normal Normal    Nitrite, Urine NEGATIVE NEGATIVE    Leukocyte Esterase, Urine NEGATIVE NEGATIVE   COVID-19, Rapid    Collection Time: 06/02/22  4:40 AM    Specimen: Nasopharyngeal Swab   Result Value Ref Range    Specimen Description . NASOPHARYNGEAL SWAB     SARS-CoV-2, Rapid Not Detected Not Detected   Microscopic Urinalysis Collection Time: 06/02/22  4:40 AM   Result Value Ref Range    -          WBC, UA None 0 - 5 /HPF    RBC, UA 0 TO 2 0 - 4 /HPF    Epithelial Cells UA None 0 - 5 /HPF   RAPID INFLUENZA A/B ANTIGENS    Collection Time: 06/02/22  5:08 AM    Specimen: Nasopharyngeal   Result Value Ref Range    Flu A Antigen NEGATIVE NEGATIVE    Flu B Antigen NEGATIVE NEGATIVE   Lactic Acid    Collection Time: 06/02/22  5:34 AM   Result Value Ref Range    Lactic Acid, Whole Blood 3.4 (H) 0.7 - 2.1 mmol/L       Imaging/Diagnostics:  XR CHEST PORTABLE    Result Date: 6/2/2022  Clear chest without acute cardiopulmonary process. CT CHEST PULMONARY EMBOLISM W CONTRAST    Result Date: 6/2/2022  1. No pulmonary emboli. 2. Patchy ground-glass changes seen in the lungs, greatest within the lower lobes. Findings suspicious for multifocal pneumonia such as viral pneumonia. Atelectasis is in the differential, with pulmonary edema felt unlikely. Assessment :      Hospital Problems           Last Modified POA    * (Principal) Overdose, accidental or unintentional, initial encounter 6/2/2022 Yes    NSTEMI (non-ST elevated myocardial infarction) ([address] 6/2/2022 Yes    Aspiration pneumonitis ([address] 6/2/2022 Yes    Alcohol use 6/2/2022 Yes    Tobacco abuse 6/2/2022 Yes    Obesity with body mass index greater than 30 6/2/2022 Yes          Plan:     Patient status inpatient in the Progressive Unit/Step down    Admit to [facility]    Accidental drug overdose  -Uncertain as to what substance, responded to Narcan  -Check UDS    NSTEMI  -Consult cardiology  -Check 2D echo  -Heparin infusion  -Start statin and aspirin therapy while work-up is underway  -Check lipids    Patchy groundglass opacities on CT imaging  -Likely aspiration pneumonitis due to downtime  -Continue azithromycin/Rocephin  -Check sputum culture, mycoplasma  -Patient does admit to a URI earlier this week.     Obesity with BMI of 32.78  -Diet/weight loss/exercise recommended    Hyperglycemiacheck A1c      Consultations:   IP CONSULT TO HOSPITALIST  IP CONSULT TO CARDIOLOGY    Patient is admitted as inpatient status because of co-morbidities listed above, severity of signs and symptoms as outlined, requirement for current medical therapies and most importantly because of direct risk to patient if care not provided in a hospital setting. Expected length of stay > 48 hours. 33 Alejandra Hennessy DO  6/2/2022  6:07 AM    Copy sent to Dr. James primary care provider on file.

## 2022-06-02 NOTE — ED NOTES
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 Resident made aware. Hospitalist at bedside to assess pt. Pt admits to snorting an unknown substance PTA. Pt reports he found a baggy of white powder on ground at motel and thought it was \"blow\". Pt reports after snorting substance he became unconscious. Pt was found shortly afterwards by parents whom contacted EMS.       Asha Saul RN  06/02/22 0726

## 2022-06-02 NOTE — ED NOTES
Pt to ED via [facility] squad due to Loss of consciousness 45mns PTA. Per squad family of the pt called them because pt is unconscious and they give 4mg of Narcan at the scene. Pt states he just drink 2 bottle of beer with \"friend\". Pt also states \"I don't know what did I take, I don't want to put someone in trouble\". No history of HTH, DM, Asthma and COPD upon assessment. EKG done, placed pt on full cardiac monitor. Vital signs taken and recorded. Call light button w/in reach. Will cont to monitor the pt.  A&O x4.     Erlin Tenorio RN  06/02/22 5653

## 2022-06-02 NOTE — ED NOTES
Report called to [address] [place] [address], all questions answered     Krishna Malone RN  06/02/22 4645

## 2022-06-02 NOTE — ED PROVIDER NOTES
[facility] ED  Emergency Department Encounter  Emergency Medicine Resident     Pt Name: Wing Mcgill  MRN: [id number]  [place] 1990  Date of evaluation: 6/2/22  PCP:  No primary care provider on file. CHIEF COMPLAINT       Chief Complaint   Patient presents with    Loss of Consciousness     45mns PTA       HISTORY OFPRESENT ILLNESS  (Location/Symptom, Timing/Onset, Context/Setting, Quality, Duration, Modifying Factors,Severity.)      Wing Mcgill is a 33 y.o. male with past medical history. Patient presents via EMS after Narcan was utilized in the field to facilitate and waking up patient. Patient was found by a friend at home in respiratory distress versus unknown reason for loss of consciousness. Upon arrival patient denied taking anything or using any drugs today. Patient did note that he does smoke tobacco.  Patient initially complaining of chest pain, shortness of breath, generally feeling unwell, nausea and vomiting. Patient did receive 4 mg of Narcan via EMS prior to arrival due to concerns for respiratory distress of unknown etiology. Patient denies any pain with urination, headache, neck pain, back pain. Patient does not believe that he was assaulted. Patient was found to his home. After multiple rounds of questioning patient did tell nursing staff that he used an unknown substance today although prior to this patient was generally feeling unwell with a scratchy throat for the past few days. PAST MEDICAL / SURGICAL / SOCIAL / FAMILY HISTORY      has no past medical history on file. has no past surgical history on file. Social:      Family Hx: History reviewed. No pertinent family history. Allergies:  Patient has no known allergies.     Home Medications:  Prior to Admission medications    Not on File       REVIEW OFSYSTEMS    (2-9 systems for level 4, 10 or more for level 5)      Positive: Nausea, vomiting, chest pain, shortness of breath    Negative: Headache, pain, blurred vision, ear pain, difficulty swallowing, cough, congestion, bloody emesis, blood in his stool, diarrhea, constipation, dysurea, penile discharge    PHYSICAL EXAM   (up to 7 for level 4, 8 or more forlevel 5)      INITIAL VITALS:   Vitals:    06/02/22 0351   BP: 124/70   Pulse: 90   Resp: 18   Temp:    SpO2: 96%        Physical Exam  Vitals and nursing note reviewed. Constitutional:       General: He is not in acute distress. Appearance: He is not ill-appearing, toxic-appearing or diaphoretic. Comments: Appears to feel unwell although is in no acute distress no respiratory distress, interactive with my examination. HENT:      Head: Normocephalic and atraumatic. Nose: Nose normal.      Mouth/Throat:      Mouth: Mucous membranes are moist.      Pharynx: Oropharynx is clear. Eyes:      General:         Right eye: No discharge. Left eye: No discharge. Comments: 1 mm bilaterally    Cardiovascular:      Rate and Rhythm: Normal rate and regular rhythm. Pulses: Normal pulses. Heart sounds: Normal heart sounds. Pulmonary:      Effort: Pulmonary effort is normal. No respiratory distress. Breath sounds: Normal breath sounds. No wheezing. Comments: Course Breath sounds noted throughout although no wheezing, no rhonchi no rales, supplemental oxygen of 2 L nasal cannula in place although patient is able to speak in full sentences, no respiratory distress. Abdominal:      General: Abdomen is flat. There is no distension. Palpations: Abdomen is soft. Tenderness: There is no abdominal tenderness. There is no guarding or rebound. Musculoskeletal:      Cervical back: Normal range of motion. Right lower leg: No edema. Left lower leg: No edema. Skin:     General: Skin is warm and dry. Neurological:      General: No focal deficit present. Mental Status: He is alert and oriented to person, place, and time.       Comments: Patient alert, oriented x3, interactive, answering questions appropriately, no focal neurological deficits, easily protecting his airway. Psychiatric:         Mood and Affect: Mood normal.         Behavior: Behavior normal.         DIFFERENTIAL  DIAGNOSIS       Initial MDM/Plan: 33 y.o. male who presents with reports of respiratory distress, found down, did awaken to Narcan otherwise generally feeling unwell prior to that. Upon my initial examination patient is resting the cot, appears to feel unwell, vomiting, protecting his airway, on 2 L nasal cannula initially, tachycardic in the 110's, alert, oriented, answering questions appropriately. Is noting that he does have chest pain and shortness of breath, will obtain cardiac work-up to include CBC, BMP, troponin, EKG, chest x-ray. Patient continues to plaint of shortness of breath. We will add on D-dimer. Patient's work-up initially BMP remarkable for elevated anion gap with elevated blood sugar. Repeat BMP after 1.5 L of fluid did show that anion gap has resolved, beta hydroxybutyrate within normal limits. Will have admitting service obtain hemoglobin A1c and decide on need for hypoglycemic treatment. Patient noted to have NSTEMI with elevated troponins, EKG within normal limits. Patient's elevated lactic, white blood cell count, source of pneumonia does meet sepsis criteria. Blood cultures drawn. IV fluids have been given, cc per KG bolus ordered, 2 L have already been ordered, will order another 1180 mL. Took quite full 30cc/kg bolus of 3.18 L IV antibiotics of Rocephin and azithromycin ordered. Repeat lactic ordered. Patient has not been hypotensive.     Sepsis Times and Checklist  Vital Signs: BP: 124/70  Heart Rate: 90  Resp: 18  Temp: 98.2 °F (36.8 °C) SpO2: 96 %  SIRS (>2)   Temp > 38.3C or < 36C   HR > 90   RR > 20   WBC > 12 or < 4 or >10% bands  SIRS (>2) and confirmed or suspected source of infection = Sepsis  Is Sepsis due to likely bacterial infection?: Yes   If not, then sepsis is due to likely n/a etiology. Sepsis Identified:  Date: 6/2/2022   Time: 0409  Sepsis Orders:  ·  CBC(required): Yes  ·  CMP: Yes  ·  PT/PTT: No  ·  Blood Cultures x2(required): Yes  ·  Urinalysis and Urine Culture: Yes  ·  Lactate(required): Yes  ·  Antibiotics Given (within 3 hours of sepsis identification, after blood cultures):  Yes    (If unable to obtain IV access for IV antibiotics within 3 hours of identification of sepsis, IM antibiotics is acceptable.)  ·             If lactate >2.0 MUST repeat within 6 hours    If elevated, is elevated lactate from a likely infectious source?: No it is secondary to dehydration. IV Fluid Bolus:  Is lactate > 4.0:  Yes  If lactate >  4.0 OR hypotension (MAP<65 mmHg) (with 2 BP readings) 30ml/kg crystalloid MUST be ordered. Must have documented in the medical record:   Administration of 30 mL/kg of crystalloid fluids would be detrimental or harmful for the patient;    AND that the patient has one of the following conditions, OR that a portion of the crystalloid fluid volume was administered as colloids (if a portion consisted of colloids, there must be an order and documentation that colloids were started or noted as given);  o n/a ordered. (If a portion consisted of colloids, there must be an order and documentation that colloids were started or noted as given.)   o the volume of crystalloid fluids in place of 30 mL/kg the patient was to receive is 3180, (Order for this amount of fluid must be placed)     Fluids must be initiated within 3 hours of sepsis identification. These fluids must have a rate > 125 ml/hr. · Is the patient Morbidly Obese (BMI > 30): No  · IV Fluids given prior to arrival can be used in this calculation but the following information must be documented:  Start time: , Type of fluid NS + LR, Volume of fluid 3180, and Rate (Duration or End time) 0631.     · Does the patient or patient advocate refuse the entire 30 ml/kg IV fluid bolus? No      Septic Shock Identified (Initial lactate > 4.0 or 2 Hypotensive Blood Pressure readings within the first 6 hours): For septic shock sepsis focus physical exam must be completed AND documented (within 6 hours). Date:  Time:       Sepsis focus exam completed. - not identified    For persistent hypotension after 30ml/kg fluid bolus vasopressors must be started (within 6 hours)   -n/a      EMERGENCY DEPARTMENT COURSE:  ED Course as of 06/02/22 0552   Thu Jun 02, 2022   1563 Basic Metabolic Panel(!):    GLUCOSE, FASTING,(!)   BUN,BUNPL 11   Creatinine 1.12   CALCIUM, SERUM, 453410 8.7   Sodium 138   Potassium 3.1(!)   Chloride 100   CO2 17(!)   Anion Gap 21(!)   GFR Non- >60   GFR  >60   GFR Comment       Hypokalemia noted, IV potassium ordered as patient is not tolerating orals. [MA]   0229 CBC with Auto Differential(!):    WBC 13.6(!)   RBC 5.09   Hemoglobin Quant 16.0   Hematocrit 46.6   MCV 91.6   MCH 31.4   MCHC 34.3   RDW 12.5   Platelet Count 968   MPV 11.3   NRBC Automated 0.0   Seg Neutrophils 56   Lymphocytes 36   Monocytes 5   Eosinophils % 1   Basophils 1   Immature Granulocytes 1(!)   Segs Absolute 7.55   Absolute Lymph # 4.89(!)   Absolute Mono # 0.70   Absolute Eos # 0.15   Basophils Absolute 0.09   Absolute Immature Granulocyte 0.17   elevated white blood cell count although patient is afebrile, low suspicion for infectious etiology at this time, likely secondary to stress response. [MA]   0229 Troponin:    Troponin, High Sensitivity 13  Troponin 13. Will obtain repeat. EKG nonacute [MA]   0230 After 1 L bolus, patient's [MA]   0230  heart rate has normalized to less than 100. Second liter ordered. [MA]   0230 D-Dimer, Quantitative:    D-Dimer, Quant 1.57  D-Dimer elevated. CT with PE protocol ordered. [MA]   0321 Anion Gap(!): 21  Noted elevated anion gap with increase blood glucose level.  Repeat BMP ordered. EPOC vbg, urine ordered for ketones. Beta-hydroxybutyrate [MA]   [id number] Patient receiving second liter of fluids now. [MA]   0456 Venous Blood Gas, POC(!):    pH, Eusebio 7.314(!)   pCO2, Eusebio 46.6   pO2, Eusebio 38.7   HCO3, Venous 23.7   Negative Base Excess, Eusebio 3(!)   O2 Sat, Eusebio 68 [MA]   0515 COVID-19, Rapid:    Specimen Description . NASOPHARYNGEAL SWAB   SARS-CoV-2, Rapid Not Detected  negative [MA]   0522 To be admitted to [facility] team for NSTEMI, pneumonia. IV antibiotics have been ordered. Fluid resuscitation initiated. Blood cultures ordered. Plan for admission for further work-up and monitoring. [MA]   4202 Discussed with [facility] attending, they are excepting patient at this time. Patient to be admitted to [facility] for pneumonia and NSTEMI. Patient admitted in vitally stable condition after remaining vitally stable throughout emergency room stay.  [MA]      ED Course User Index  [MA] Khai Delaney,         DIAGNOSTIC RESULTS / EMERGENCYDEPARTMENT COURSE / MDM     LABS:  Labs Reviewed   CBC WITH AUTO DIFFERENTIAL - Abnormal; Notable for the following components:       Result Value    WBC 13.6 (*)     Immature Granulocytes 1 (*)     Absolute Lymph # 4.89 (*)     All other components within normal limits   BASIC METABOLIC PANEL - Abnormal; Notable for the following components:    Glucose 296 (*)     Potassium 3.1 (*)     CO2 17 (*)     Anion Gap 21 (*)     All other components within normal limits   TROPONIN - Abnormal; Notable for the following components:    Troponin, High Sensitivity 42 (*)     All other components within normal limits   TROPONIN - Abnormal; Notable for the following components:    Troponin, High Sensitivity 59 (*)     All other components within normal limits   URINALYSIS WITH REFLEX TO CULTURE - Abnormal; Notable for the following components:    Specific Gravity, UA 1.037 (*)     Urine Hgb TRACE (*)     All other components within normal limits   BASIC METABOLIC PANEL - Abnormal; Notable for the following components:    Glucose 138 (*)     Calcium 8.5 (*)     All other components within normal limits   CALCIUM, IONIC (POC) - Abnormal; Notable for the following components:    POC Ionized Calcium 1.08 (*)     All other components within normal limits   VENOUS BLOOD GAS, POINT OF CARE - Abnormal; Notable for the following components:    pH, Eusebio 7.314 (*)     Negative Base Excess, Eusebio 3 (*)     All other components within normal limits   LACTIC ACID,POINT OF CARE - Abnormal; Notable for the following components:    POC Lactic Acid 5.10 (*)     All other components within normal limits   POCT GLUCOSE - Abnormal; Notable for the following components:    POC Glucose 133 (*)     All other components within normal limits   POCT GLUCOSE - Normal   COVID-19, RAPID   RAPID INFLUENZA A/B ANTIGENS   CULTURE, BLOOD 1   CULTURE, BLOOD 1   TROPONIN   D-DIMER, QUANTITATIVE   BETA-HYDROXYBUTYRATE   ELECTROLYTES PLUS   HGB/HCT   APTT   MICROSCOPIC URINALYSIS   APTT   APTT   LACTIC ACID   HEPATIC FUNCTION PANEL   CREATININE W/GFR POINT OF CARE   UREA N (POC)   POC BLOOD GAS         RADIOLOGY:  XR CHEST PORTABLE    Result Date: 6/2/2022  EXAMINATION: ONE XRAY VIEW OF THE CHEST 6/2/2022 1:24 am COMPARISON: None. HISTORY: ORDERING SYSTEM PROVIDED HISTORY: shortness of breath, narcan, tachycardia TECHNOLOGIST PROVIDED HISTORY: shortness of breath, narcan, tachycardia FINDINGS: Cardiac and mediastinal silhouettes appear within normal limits for size. Pulmonary vascularity is normal.  No parenchymal consolidation or pleural effusion. No pneumothorax. No acute osseous abnormality. Clear chest without acute cardiopulmonary process. CT CHEST PULMONARY EMBOLISM W CONTRAST    Result Date: 6/2/2022  EXAMINATION: CTA OF THE CHEST 6/2/2022 3:28 am TECHNIQUE: CTA of the chest was performed after the administration of intravenous contrast.  Multiplanar reformatted images are provided for review.   MIP images are provided for review. Automated exposure control, iterative reconstruction, and/or weight based adjustment of the mA/kV was utilized to reduce the radiation dose to as low as reasonably achievable. COMPARISON: None. HISTORY: ORDERING SYSTEM PROVIDED HISTORY: elevated dimer, shortness of breath, r/o PE TECHNOLOGIST PROVIDED HISTORY: elevated dimer, shortness of breath, r/o PE Decision Support Exception - unselect if not a suspected or confirmed emergency medical condition->Emergency Medical Condition (MA) FINDINGS: Pulmonary Arteries: Pulmonary arteries are adequately opacified for evaluation. No evidence of intraluminal filling defect to suggest pulmonary embolism. Main pulmonary artery is normal in caliber. Mediastinum: No thoracic aortic aneurysm. No dissection. No focal esophageal thickening. No mediastinal lymphadenopathy. Minimal fluid in the distal thoracic esophagus may be related to reflux. Lungs/pleura: No pleural effusion. Mild ground-glass changes are seen, greatest within the lower lobes. No consolidation, spiculated lung mass, or peribronchial thickening. Upper Abdomen: No acute process seen within the upper abdomen. No focal inflammatory process. Soft Tissues/Bones: No axillary or supraclavicular lymphadenopathy. No acute osseous abnormality. Minimal degenerative change in the spine with small Schmorl's nodes in the midthoracic spine. 1. No pulmonary emboli. 2. Patchy ground-glass changes seen in the lungs, greatest within the lower lobes. Findings suspicious for multifocal pneumonia such as viral pneumonia. Atelectasis is in the differential, with pulmonary edema felt unlikely. EKG  Sinus tachycardia, rate of 109, parable 154,  . No ST segment elevation, no ST segment depression, no T wave abnormalities. Nonspecific EKG. P EKG showing resolution of sinus tachycardia, ventricular rate of 83. VT interval 172,  .   No ST segment elevation, no ST segment depression, T wave flattening in leads III noted. Nonspecific EKG. All EKG's are interpreted by the Emergency Department Physicianwho either signs or Co-signs this chart in the absence of a cardiologist.        PROCEDURES:  None    CONSULTS:  IP CONSULT TO HOSPITALIST  IP CONSULT TO CARDIOLOGY      FINAL IMPRESSION      1. NSTEMI (non-ST elevated myocardial infarction) ([facility] [address]    2. Hyperglycemia          DISPOSITION / PLAN     DISPOSITION  06/02/2022 05:44:57 AM      PATIENT REFERRED TO:  No follow-up provider specified.     DISCHARGE MEDICATIONS:  New Prescriptions    No medications on file       Ivory Vasquez DO  Emergency Medicine Resident    (Please note that portions of this note were completed with a voice recognition program.Efforts were made to edit the dictations but occasionally words are mis-transcribed.)       Ivory Vasquez DO  Resident  06/02/22 3031

## 2022-06-02 NOTE — PROGRESS NOTES
Physical Therapy  Facility/Department: [facility]  Physical Therapy Initial Assessment    Name: Chan Aranda  : 1990  MRN: [id number]  Date of Service: 2022  Chief Complaint   Patient presents with    Loss of Consciousness     45mns PTA     Discharge Recommendations:  No therapy recommended at discharge      Patient Diagnosis(es): The primary encounter diagnosis was NSTEMI (non-ST elevated myocardial infarction) ([facility] [address] A diagnosis of Hyperglycemia was also pertinent to this visit. Past Medical History:  has a past medical history of Alcohol use, History of cocaine use, Obesity with body mass index greater than 30, and Tobacco abuse. Past Surgical History:  has no past surgical history on file. Assessment   Assessment: the pt ambulated 300 ft without a device x SBA. He had no c/o dizziness, pain or fatigue with ambulation. He ambulated safely with no further need for any additional PT intervention. Therapy Prognosis: Good  Decision Making: Medium Complexity  Requires PT Follow-Up: No  Activity Tolerance  Activity Tolerance: Patient tolerated treatment well     Plan   Plan  Plan: Discharge with evaluation only  Safety Devices  Type of Devices: Nurse notified,Left in bed,Call light within reach     Restrictions  Position Activity Restriction  Other position/activity restrictions:  Up with assist     Subjective   General  Patient assessed for rehabilitation services?: Yes  Response To Previous Treatment: Not applicable  Family / Caregiver Present: No  Follows Commands: Within Functional Limits  Subjective  Subjective: Pt reports back pain of 2/10      RN amd pt agreeable to PT eval     Social/Functional History  Social/Functional History  Lives With: Spouse  Type of Home: House  Home Layout: One level  Home Access: Stairs to enter without rails  Entrance Stairs - Number of Steps: 1 step  Bathroom Shower/Tub: Tub/Shower unit  Bathroom Toilet: Standard  Home Equipment:  (No DME at baseline)  Has the patient had two or more falls in the past year or any fall with injury in the past year?: No  ADL Assistance: [address]: [address] [place] [address] Responsibilities: Yes  Ambulation Assistance: Independent  Transfer Assistance: Independent  Active : Yes  Mode of Transportation: Car  Occupation: Full time employment  Type of Occupation:   Leisure & Hobbies: Basketball, working out, and time with friends  Vision/Hearing  Vision  Vision: Within Functional Limits  Hearing  Hearing: Within functional limits    Cognition   Orientation  Overall Orientation Status: Within Functional Limits  Cognition  Overall Cognitive Status: WFL     Objective   Heart Rate: 89  BP: 123/79  MAP (Calculated): 93.67  Resp: 18  SpO2: 97 %  O2 Device: on room air     AROM RLE (degrees)  RLE AROM: WNL  AROM LLE (degrees)  LLE AROM : WNL  AROM RUE (degrees)  RUE AROM : WNL  AROM LUE (degrees)  LUE AROM : WNL  Strength RLE  Strength RLE: WNL  Strength LLE  Strength LLE: WNL  Strength RUE  Strength RUE: WNL  Strength LUE  Strength LUE: WNL        Bed mobility  Supine to Sit: Stand by assistance  Sit to Supine: Stand by assistance  Scooting: Stand by assistance  Transfers  Sit to Stand: Stand by assistance  Stand to sit: Stand by assistance  Ambulation  Surface: level tile  Device: No Device  Assistance: Stand by assistance  Distance: amb 300 ft without a device x SBA     Balance  Posture: Good  Sitting - Static: Good  Sitting - Dynamic: Good  Standing - Static: Good  Standing - Dynamic: Good         OutComes Score     AM-PAC Score  AM-PAC Inpatient Mobility Raw Score : 21 (06/02/22 1448)  AM-PAC Inpatient T-Scale Score : 50.25 (06/02/22 1448)  Mobility Inpatient CMS 0-100% Score: 28.97 (06/02/22 1448)  Mobility Inpatient CMS G-Code Modifier : CJ (06/02/22 1448)        Goals  Short Term Goals  Time Frame for Short term goals: No PT needs at this time    DC   PT     Education  Patient Education  Education Given To: Patient  Education Provided: Role of Therapy;Plan of Care  Education Method: Verbal  Barriers to Learning: None  Education Outcome: Verbalized understanding    Therapy Time   Individual Concurrent Group Co-treatment   Time In 1400         Time Out 1423         Minutes [address] Rush Zhao, PT

## 2022-06-03 VITALS
SYSTOLIC BLOOD PRESSURE: 123 MMHG | TEMPERATURE: 98 F | HEIGHT: 71 IN | DIASTOLIC BLOOD PRESSURE: 89 MMHG | HEART RATE: 83 BPM | RESPIRATION RATE: 14 BRPM | BODY MASS INDEX: 32.62 KG/M2 | OXYGEN SATURATION: 94 % | WEIGHT: 233.03 LBS

## 2022-06-03 PROBLEM — T50.901A DRUG OVERDOSE, ACCIDENTAL OR UNINTENTIONAL, INITIAL ENCOUNTER: Status: ACTIVE | Noted: 2022-06-03

## 2022-06-03 LAB
ABSOLUTE EOS #: 0.15 K/UL (ref 0–0.44)
ABSOLUTE IMMATURE GRANULOCYTE: 0.08 K/UL (ref 0–0.3)
ABSOLUTE LYMPH #: 4.86 K/UL (ref 1.1–3.7)
ABSOLUTE MONO #: 0.66 K/UL (ref 0.1–1.2)
ANION GAP SERPL CALCULATED.3IONS-SCNC: 11 MMOL/L (ref 9–17)
BASOPHILS # BLD: 1 % (ref 0–2)
BASOPHILS ABSOLUTE: 0.06 K/UL (ref 0–0.2)
BUN BLDV-MCNC: 7 MG/DL (ref 6–20)
CALCIUM SERPL-MCNC: 9 MG/DL (ref 8.6–10.4)
CHLORIDE BLD-SCNC: 103 MMOL/L (ref 98–107)
CO2: 25 MMOL/L (ref 20–31)
CREAT SERPL-MCNC: 0.72 MG/DL (ref 0.7–1.2)
EOSINOPHILS RELATIVE PERCENT: 1 % (ref 1–4)
GFR AFRICAN AMERICAN: >60 ML/MIN
GFR NON-AFRICAN AMERICAN: >60 ML/MIN
GFR SERPL CREATININE-BSD FRML MDRD: ABNORMAL ML/MIN/{1.73_M2}
GLUCOSE BLD-MCNC: 105 MG/DL (ref 70–99)
HCT VFR BLD CALC: 43.6 % (ref 40.7–50.3)
HEMOGLOBIN: 15.4 G/DL (ref 13–17)
IMMATURE GRANULOCYTES: 1 %
INR BLD: 1
LV EF: 46 %
LVEF MODALITY: NORMAL
LYMPHOCYTES # BLD: 45 % (ref 24–43)
MCH RBC QN AUTO: 31.5 PG (ref 25.2–33.5)
MCHC RBC AUTO-ENTMCNC: 35.3 G/DL (ref 28.4–34.8)
MCV RBC AUTO: 89.2 FL (ref 82.6–102.9)
MONOCYTES # BLD: 6 % (ref 3–12)
NRBC AUTOMATED: 0 PER 100 WBC
PARTIAL THROMBOPLASTIN TIME: 70.2 SEC (ref 20.5–30.5)
PDW BLD-RTO: 12.4 % (ref 11.8–14.4)
PLATELET # BLD: 168 K/UL (ref 138–453)
PMV BLD AUTO: 10.3 FL (ref 8.1–13.5)
POTASSIUM SERPL-SCNC: 4.2 MMOL/L (ref 3.7–5.3)
PROTHROMBIN TIME: 10.5 SEC (ref 9.1–12.3)
RBC # BLD: 4.89 M/UL (ref 4.21–5.77)
SEG NEUTROPHILS: 46 % (ref 36–65)
SEGMENTED NEUTROPHILS ABSOLUTE COUNT: 5.08 K/UL (ref 1.5–8.1)
SODIUM BLD-SCNC: 139 MMOL/L (ref 135–144)
WBC # BLD: 10.9 K/UL (ref 3.5–11.3)

## 2022-06-03 PROCEDURE — G0378 HOSPITAL OBSERVATION PER HR: HCPCS

## 2022-06-03 PROCEDURE — 85730 THROMBOPLASTIN TIME PARTIAL: CPT

## 2022-06-03 PROCEDURE — 99232 SBSQ HOSP IP/OBS MODERATE 35: CPT | Performed by: FAMILY MEDICINE

## 2022-06-03 PROCEDURE — 93306 TTE W/DOPPLER COMPLETE: CPT

## 2022-06-03 PROCEDURE — 96366 THER/PROPH/DIAG IV INF ADDON: CPT

## 2022-06-03 PROCEDURE — 85025 COMPLETE CBC W/AUTO DIFF WBC: CPT

## 2022-06-03 PROCEDURE — 36415 COLL VENOUS BLD VENIPUNCTURE: CPT

## 2022-06-03 PROCEDURE — 80048 BASIC METABOLIC PNL TOTAL CA: CPT

## 2022-06-03 PROCEDURE — 85610 PROTHROMBIN TIME: CPT

## 2022-06-03 NOTE — PROGRESS NOTES
[facility]  Progress Note                   Date:   6/3/2022  Patient name: Darius Culp  Date of admission:  6/2/2022 12:10 AM  MRN:   [id number]  YOB: 1990  PCP: No primary care provider on file. Reason for Admission: Hyperglycemia [R73.9]  NSTEMI (non-ST elevated myocardial infarction) ([facility] [address] [I21.4]  Overdose, accidental or unintentional, initial encounter [T50.881A]    Subjective:       Clinical Changes /Abnormalities: Seen in echo lab & discussed on floor with RN. No acute CV issues/concerns overnight. Denies any CP or SOB. Labs, vitals, & tele reviewed. Remains SR without ectopy. Review of Systems    Medications:   Scheduled Meds:   potassium chloride  10 mEq IntraVENous Once    sodium chloride flush  5-40 mL IntraVENous 2 times per day     Continuous Infusions:   heparin (PORCINE) Infusion 16 Units/kg/hr (06/02/22 2302)    sodium chloride       CBC:   Recent Labs     06/02/22 0050 06/03/22  0557   WBC 13.6* 10.9   HGB 16.0 15.4    168     BMP:    Recent Labs     06/02/22 0050 06/02/22 0050 06/02/22 0332 06/02/22 0336 06/02/22 0348 06/03/22  0557     --   --  137  --  139   K 3.1*  --   --  4.4  --  4.2     --   --  100  --  103   CO2 17*  --   --  20  --  25   BUN 11  --   --  10  --  7   CREATININE 1.12  --  1.01 0.94  --  0.72   GLUCOSE 296*   < >  --  138* 133 105*    < > = values in this interval not displayed. Hepatic:  Recent Labs     06/02/22 0228 06/02/22  0904   AST 50* 73*   ALT 58* 61*   BILITOT 0.24* 0.40   ALKPHOS 119 116     Troponin:   Recent Labs     06/02/22 0228 06/02/22 0336 06/02/22  1129   TROPHS 42* 59* 37*     BNP: No results for input(s): BNP in the last 72 hours.   Lipids:   Recent Labs     06/02/22  0636   CHOL 178   HDL 48     INR:   Recent Labs     06/02/22  0904 06/03/22  0557   INR 1.0 1.0       Objective:   Vitals: /83   Pulse 80   Temp 97.6 °F (36.4 °C) (Temporal)   Resp 18   Ht 5' 11\" (1.803 m)   Wt 233 lb 0.4 oz (105.7 kg)   SpO2 96%   BMI 32.50 kg/m²   General appearance: alert and cooperative with exam  HEENT: Head: Normocephalic, no lesions, without obvious abnormality. Neck:no JVD, trachea midline, no adenopathy  Lungs: Clear to auscultation  Heart: Regular rate and rhythm, s1/s2 auscultated, no murmurs  Abdomen: soft, non-tender, bowel sounds active  Extremities: no edema  Neurologic: not done        Assessment / Acute Cardiac Problems:   1. Elevated trop d/t substance abuse/overdose  2. ETOH  3. Drug abuse  4. CAP  5. Obesity with BMI >30    Patient Active Problem List:     Overdose, accidental or unintentional, initial encounter     Alcohol use     Tobacco abuse     Obesity with body mass index greater than 30     NSTEMI (non-ST elevated myocardial infarction) ([address]     Aspiration pneumonitis (HCC)     Polysubstance abuse ([facility] [address]      Plan of Treatment:   1. Stable. Denies any chest pain or SOB. Can d/c Heparin gtt  2. Echo being completed now. If low risk will be OK for discharge from CV standpoint with OP f/u.     Electronically signed by KANDIS Mane CNP on 6/3/2022 at 9:35 AM  [address]  [phone]

## 2022-06-03 NOTE — PLAN OF CARE
Echo reviewed. OK for discharge from CV standpoint with OP f/u in clinic to consider stress testing.     KANDIS Underwood - CNP

## 2022-06-06 ENCOUNTER — CARE COORDINATION (OUTPATIENT)
Dept: CASE MANAGEMENT | Age: 32
End: 2022-06-06

## 2022-06-06 NOTE — CARE COORDINATION
[facility] Transitions Initial Follow Up Call    Call within 2 business days of discharge: Yes    Patient: Mahogany Gould Patient : 1990   MRN: <[id number]>  Reason for Admission:   Discharge Date: 6/3/22 RARS: Readmission Risk Score: 5.7 ( )      Last Discharge [facility]       Complaint Diagnosis Description Type Department Provider    22 Loss of Consciousness NSTEMI (non-ST elevated myocardial infarction) ([facility] [address] . .. ED to Hosp-Admission (Discharged) (ADMITTED) CHARLIE 4A Davy Jiménez MD; [place], ... Spoke with: patient's mother/ patient present    Facility: [facility]    Non-face-to-face services provided:  Scheduled appointment with PCP-patient has an appt. with pcp on 22  Obtained and reviewed discharge summary and/or continuity of care documents  Assessment and support for treatment adherence and medication management-reviewed discharge instructions and medications,[facility], has appointment on [facility], no c/o sob, cp, ha, bod aches, f/c, n/v, is breathing ok, eating and drinking, no vns, will look into gett a f/u schedule with a cardiologist near home, explained role of CTN provided contact information, will folow//JU  Assistance in accessing community resources-help patient/mother set up My Chart  Patient is currently enrolled in Care Transitions. · Start day 2022, End Date 22  · Readmission Risk Score: low  · Please see patient outreach encounters for further details  · For questions contact: Ginny JENSEN, RN, [phone]  Transitions of Care Initial Call    Was this an external facility discharge? No Discharge Facility: [facility]    Challenges to be reviewed by the provider       Advance Care Planning:   Does patient have an Advance Directive: reviewed and current, reviewed and needs to be updated, not on file; education provided, not on file, patient declined education, decision maker updated and referral to internal ACP facilitator.      Care Transition Nurse contacted the patient by telephone to perform post hospital discharge assessment. Verified name and  with family as identifiers. Provided introduction to self, and explanation of the CTN role. CTN reviewed discharge instructions, medical action plan and red flags with family who verbalized understanding. Family given an opportunity to ask questions and does not have any further questions or concerns at this time. Were discharge instructions available to patient? Yes. Reviewed appropriate site of care based on symptoms and resources available to patient including: PCP  Specialist  Urgent care clinics  When to call 911. agrees to contact the PCP office for questions related to their healthcare. Medication reconciliation was performed with family, who verbalizes understanding of administration of home medications. Advised obtaining a 90-day supply of all daily and as-needed medications. Was patient discharged with a pulse oximeter? no    CTN provided contact information. Plan for follow-up call in 5-7 days based on severity of symptoms and risk factors. Plan for next call: follow up on HFU and appointment with cardiology    ·     Care Transitions 24 Hour Call    Schedule Follow Up Appointment with PCP: Completed  Do you have a copy of your discharge instructions?: Yes  Have you been contacted by a [address]?: No  Have you scheduled your follow up appointment?:  (Comment: mother stated already schedule an anppointment with pcp on 22)  Do you feel like you have everything you need to keep you well at home?: Yes  Care Transitions Interventions     Other Therapy Services: Declined      Other Services: Declined          Follow Up  No future appointments.     Gopi Esteban RN

## 2022-06-07 LAB
CULTURE: NORMAL
CULTURE: NORMAL
Lab: NORMAL
Lab: NORMAL
SPECIMEN DESCRIPTION: NORMAL
SPECIMEN DESCRIPTION: NORMAL

## 2022-06-07 NOTE — ED PROVIDER NOTES
[address] [facility] [facility]   [facility]  Faculty Attestation       I performed a history and physical examination of the patient and discussed management with the resident. I reviewed the residents note and agree with the documented findings including all diagnostic interpretations and plan of care. Any areas of disagreement are noted on the chart. I was personally present for the key portions of any procedures. I have documented in the chart those procedures where I was not present during the key portions. I have reviewed the emergency nurses triage note. I agree with the chief complaint, past medical history, past surgical history, allergies, medications, social and family history as documented unless otherwise noted below. Documentation of the HPI, Physical Exam and Medical Decision Making performed by scribbo is based on my personal performance of the HPI, PE and MDM. For Physician Assistant/ Nurse Practitioner cases/documentation I have personally evaluated this patient and have completed at least one if not all key elements of the E/M (history, physical exam, and MDM). Additional findings are as noted. Pertinent Comments     Primary Care Physician: No primary care provider on file. ED Triage Vitals [06/02/22 0012]   BP Temp Temp Source Heart Rate Resp SpO2 Height Weight   104/72 98.2 °F (36.8 °C) Oral (!) 109 18 94 % 5' 11\" (1.803 m) 235 lb (106.6 kg)      Past Medical History: reviewed with patient and no pertinent history reported  Past Surgical History: reviewed with patient and no pertinent history reported    History/Physical:   This is a 33 y.o. male who initially presented to the ED after being found unresponsive and received 4 mg of Narcan with improvement of symptoms. Patient initially stated that he had just had 2 beers, later he said that he may have been stuck with some needles, but later he did admit that he found a bag of unknown white substance that he used tonight.   He does report feeling unwell over the last 2-3 days. Currently complaining of chest pain, shortness of breath, and feeling fatigued. On exam patient appears uncomfortable but in no significant distress. Has mild increased work of breathing. NC/AT. PERRL. Heart sounds are tachycardic but regular. Increased work of breathing, but no wheezes or rhonchi. On 2L on my exam.  Abdomen is soft and nontender. Alert and oriented x 4. No focal deficits. MDM/Plan:   Patient w/ chest pain, sob, and fatigue. Initially found unresponsive and responded to narcan. Intiially denying drug use, but later divulged that he took unknown white powder  However, given his symptoms and oxygen use we will get cardiac workup. Patient found to have an elevated glucose with anion gap. Will check DKA labs  Ddimer elevated will get CT r/o PE  CT results showed atypical pneumonia at 4:09 AM - will treat as sepsis. NSTEMI with rising troponins  Admit. EKG Interpretation    Interpreted by emergency department physician    Clinical Impression: sinus tachy with incomplete RBBB    Jasen Veras MD    Critical Care: There was significant risk of life threatening deterioration of patient's condition requiring my direct management. Critical care time 10 minutes, excluding any documented procedures.     Jasen Veras MD  Attending Emergency Physician         Jasen Veras MD  06/07/22 7701

## 2022-06-14 ENCOUNTER — CARE COORDINATION (OUTPATIENT)
Dept: CASE MANAGEMENT | Age: 32
End: 2022-06-14

## 2022-06-14 NOTE — CARE COORDINATION
[facility] Transitions Follow Up Call    2022    Patient: Connie Lee  Patient : 1990   MRN: [id number]  Reason for Admission: NSTEMI  Discharge Date: 6/3/22 RARS: Readmission Risk Score: 5.7 ( )    Attempted to call. The listed phone number is not in service. Care Transitions Episode Closed. Follow Up  No future appointments.     Galileo Pulido RN